# Patient Record
Sex: FEMALE | Race: WHITE | NOT HISPANIC OR LATINO | Employment: STUDENT | URBAN - METROPOLITAN AREA
[De-identification: names, ages, dates, MRNs, and addresses within clinical notes are randomized per-mention and may not be internally consistent; named-entity substitution may affect disease eponyms.]

---
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
## 2023-12-08 ENCOUNTER — OFFICE VISIT (OUTPATIENT)
Dept: PODIATRY | Facility: CLINIC | Age: 18
End: 2023-12-08
Payer: COMMERCIAL

## 2023-12-08 VITALS
WEIGHT: 142.2 LBS | DIASTOLIC BLOOD PRESSURE: 68 MMHG | HEIGHT: 66 IN | BODY MASS INDEX: 22.85 KG/M2 | SYSTOLIC BLOOD PRESSURE: 102 MMHG

## 2023-12-08 DIAGNOSIS — L60.0 INGROWING LEFT GREAT TOENAIL: Primary | ICD-10-CM

## 2023-12-08 DIAGNOSIS — M79.675 GREAT TOE PAIN, LEFT: ICD-10-CM

## 2023-12-08 PROCEDURE — 99203 OFFICE O/P NEW LOW 30 MIN: CPT | Mod: 25 | Performed by: PODIATRIST

## 2023-12-08 PROCEDURE — 11730 AVULSION NAIL PLATE SIMPLE 1: CPT | Mod: TA | Performed by: PODIATRIST

## 2023-12-08 RX ORDER — DESOG-E.ESTRADIOL/E.ESTRADIOL 21-5 (28)
1 TABLET ORAL
COMMUNITY
Start: 2023-08-31

## 2023-12-08 RX ORDER — ESCITALOPRAM OXALATE 20 MG/1
1 TABLET ORAL
COMMUNITY
Start: 2023-10-19

## 2023-12-08 RX ORDER — BUSPIRONE HYDROCHLORIDE 10 MG/1
1 TABLET ORAL
COMMUNITY
Start: 2023-09-24

## 2023-12-08 NOTE — LETTER
"    12/8/2023         RE: Ana María Craft  [address] [facility] [address]        Dear Colleague,    Thank you for referring your patient, Ana María Craft, to the [facility] PODI[facility]RY. Please see a copy of my visit note below.    ASSESSMENT:  Encounter Diagnoses   Name Primary?     Ingrowing left great toenail, medial and lateral edge Yes     Great toe pain, left      MEDICAL DECISION MAKING:  The potential causes and nature of an ingrown toenail were discussed with the patient.  We reviewed the natural history/prognosis of the condition and potential risks if no treatment is provided.      Treatment options discussed included conservative management (oral antibiotics when coexisting infection, soaking of the foot, the use of a toe spacer, adequate width shoes) as well as surgical management (partial or total nail removal).  The pros and cons of both forms of treatment were reviewed.      I discussed the option of permanent removal via chemical matrixectomy. This is a reasonable option when there is no co-existing infection.     Ana María Craft elected to a partial avulsion, medial and lateral edge, left great toe.    Nail Avulsion Procedure, Bilateral edges  (non permanent removal)    The procedure was discussed with Ana María Craft, including risk of infection, abnormal nail regrowth, and possible need for a future, repeat nail procedure.  Post-procedure home cares were reviewed.  These cares are important for preventing infection and aiding in timely healing.   Verbal and written consent was obtained.   The site was marked and the \"Time Out\" called.     The base of the left great toe was injected with 2 cc of  2% Lidocaine plain.  The toe was then prepped with betadine solution. A tourniquet was placed around the base of the toe for hemostasis.   Next the toe was checked for adequate anesthesia.     The medial and lateral aspect of the nail was loosened from the nail bed and " marginal soft tissue attachments with a blunt instrument. A nail splitter was then used to make a longitudinal cut approximately 2 mm from each nail fold.  It was completed on both sides, atraumatically, under the eponychium with a [place] blade. Next, the medial and lateral nail edges were grasped with a hemostat and removed in total.      The tourniquet was removed.  Bacitracin ointment was applied to the nail bed edges, followed by a compressive dressing. TThe procedure was tolerated well without complication.     Ana María Craftis instructed to watch for, and call if,  increasing redness, drainage, and pain after 2-3 days.  Post procedure instructions provided - handout given.    Ryan Garcia DPM         Disclaimer: This note consists of symbols derived from keyboarding, dictation and/or voice recognition software. As a result, there may be errors in the script that have gone undetected. Please consider this when interpreting information found in this chart.    Ryan Garcia DPM, FACFAS, MS    [facility] of Podiatry/Foot & Ankle Surgery      ____________________________________________________________________    HPI:       Ana María presents today reporting an ingrown toenail involving her left great toe.  The problem started 2 to 3 weeks ago.  Throbbing and shooting  Pain can be rated as high as a 9 out of 10  Comes and goes  She works as a     *No past medical history on file.*  *No past surgical history on file.*  *  Current Outpatient Medications   Medication Sig Dispense Refill     busPIRone (BUSPAR) 10 MG tablet Take 1 tablet by mouth 2 times daily       escitalopram (LEXAPRO) 20 MG tablet Take 1 tablet by mouth daily at 2 pm       KARIVA 0.15-0.02/0.01 MG (21/5) tablet Take 1 tablet by mouth daily at 2 pm       oxyCODONE (ROXICODONE) 5 MG tablet Take 1 tablet (5 mg) by mouth every 6 hours as needed for pain (Patient not taking: Reported on 12/8/2023) 12 tablet 0         EXAM:    Vitals: BP  "102/68   Ht 1.676 m (5' 6\")   Wt 64.5 kg (142 lb 3.2 oz)   BMI 22.95 kg/m    BMI: Body mass index is 22.95 kg/m .    Constitutional:  Ana María Craft is in no apparent distress, appears well-nourished.  Cooperative with history and physical exam.    Vascular:  Pedal pulses are palpable for both the DP and PT arteries.  CFT < 3 sec.  No edema.      Neuro: Light touch sensation is intact to the L4, L5, S1 distributions  No evidence of weakness, spasticity, or contracture in the lower extremities.     Derm: Localized tenderness, erythema and edema involving the medial and lateral skin folds of the left hallux nail unit.    Musculoskeletal:    Lower extremity muscle strength is normal. No gross deformities.          Again, thank you for allowing me to participate in the care of your patient.        Sincerely,        Ryan Garcia, MADAN  "

## 2023-12-08 NOTE — PATIENT INSTRUCTIONS
[facility] you for choosing [facility]!    DR. CAMACHO'S CLINIC LOCATIONS:     [facility] TRIAGE LINE: [phone]   [address] APPOINTMENTS: [phone]   [place] [address] RADIOLOGY: [phone]   (Every other Tues - Wed - Fri PM) SET UP SURGERY: [phone]    PHYSICAL THERAPY: [phone]   [facility] BILLING QUESTIONS: [phone] [address] FAX: [phone]   [place] [address]    (Thurs & Fri AM)      INGROWN TOENAIL REMOVAL HOME CARE  1. Keep bandage on until that evening or the day after your procedure. If the bandage falls off, start the soaking process.    2. Some bleeding is normal. If bleeding seems excessive to you, place ice on top of your foot for 15-20 minutes and elevate your foot above heart level.    3. Over the counter pain medication (tylenol / ibuprofen), elevating your foot and ice application is all you will need for pain control.    4. If the bandage feels too tight and your toe is throbbing it is ok to remove the bandage and start soaking.     5. For one to two weeks, soak your foot twice a day in mild skin friendly soap (dish or hand soap) and warm water for 15 minutes. It is ok to soak your foot for a few minutes to loosen the dressing applied in the clinic. After soaking, blot dry and apply a regular band aid.    6. It is normal to experience some discomfort and redness around the nail for several days following the procedure. Drainage will likely appear a red - yellow. This is normal. If your toe is still draining a red - yellow fluid after 2 weeks keep continuing to soak foot another few days.    7. Initial discomfort might last for 2-3 days. You may resume with regular activity as soon as you are comfortable, as long as you keep the wound clean and dry and follow the soaking instruction. It is recommended that you do not enter public swimming pools/hot tubs while your toe is draining.    8. If you are  experiencing worsening pain and redness or notice pus after 2-3 days please contact the clinic. Ask to speak with a triage nurse and they will inform our team of your symptoms and we can advise if a follow up is needed.

## 2023-12-08 NOTE — PROGRESS NOTES
"ASSESSMENT:  Encounter Diagnoses   Name Primary?    Ingrowing left great toenail, medial and lateral edge Yes    Great toe pain, left      MEDICAL DECISION MAKING:  The potential causes and nature of an ingrown toenail were discussed with the patient.  We reviewed the natural history/prognosis of the condition and potential risks if no treatment is provided.      Treatment options discussed included conservative management (oral antibiotics when coexisting infection, soaking of the foot, the use of a toe spacer, adequate width shoes) as well as surgical management (partial or total nail removal).  The pros and cons of both forms of treatment were reviewed.      I discussed the option of permanent removal via chemical matrixectomy. This is a reasonable option when there is no co-existing infection.     Ana María Craft elected to a partial avulsion, medial and lateral edge, left great toe.    Nail Avulsion Procedure, Bilateral edges  (non permanent removal)    The procedure was discussed with Ana María Craft, including risk of infection, abnormal nail regrowth, and possible need for a future, repeat nail procedure.  Post-procedure home cares were reviewed.  These cares are important for preventing infection and aiding in timely healing.   Verbal and written consent was obtained.   The site was marked and the \"Time Out\" called.     The base of the left great toe was injected with 2 cc of  2% Lidocaine plain.  The toe was then prepped with betadine solution. A tourniquet was placed around the base of the toe for hemostasis.   Next the toe was checked for adequate anesthesia.     The medial and lateral aspect of the nail was loosened from the nail bed and marginal soft tissue attachments with a blunt instrument. A nail splitter was then used to make a longitudinal cut approximately 2 mm from each nail fold.  It was completed on both sides, atraumatically, under the eponychium with a [place] blade. Next, the medial and " "lateral nail edges were grasped with a hemostat and removed in total.      The tourniquet was removed.  Bacitracin ointment was applied to the nail bed edges, followed by a compressive dressing. TThe procedure was tolerated well without complication.     Ana María Craftis instructed to watch for, and call if,  increasing redness, drainage, and pain after 2-3 days.  Post procedure instructions provided - handout given.    Ryan Garcia DPM         Disclaimer: This note consists of symbols derived from keyboarding, dictation and/or voice recognition software. As a result, there may be errors in the script that have gone undetected. Please consider this when interpreting information found in this chart.    Ryan Garcia DPM, FACFAS, MS    [facility] of Podiatry/Foot & Ankle Surgery      ____________________________________________________________________    HPI:       Ana María presents today reporting an ingrown toenail involving her left great toe.  The problem started 2 to 3 weeks ago.  Throbbing and shooting  Pain can be rated as high as a 9 out of 10  Comes and goes  She works as a     *No past medical history on file.*  *No past surgical history on file.*  *  Current Outpatient Medications   Medication Sig Dispense Refill    busPIRone (BUSPAR) 10 MG tablet Take 1 tablet by mouth 2 times daily      escitalopram (LEXAPRO) 20 MG tablet Take 1 tablet by mouth daily at 2 pm      KARIVA 0.15-0.02/0.01 MG (21/5) tablet Take 1 tablet by mouth daily at 2 pm      oxyCODONE (ROXICODONE) 5 MG tablet Take 1 tablet (5 mg) by mouth every 6 hours as needed for pain (Patient not taking: Reported on 12/8/2023) 12 tablet 0         EXAM:    Vitals: /68   Ht 1.676 m (5' 6\")   Wt 64.5 kg (142 lb 3.2 oz)   BMI 22.95 kg/m    BMI: Body mass index is 22.95 kg/m .    Constitutional:  Ana María Craft is in no apparent distress, appears well-nourished.  Cooperative with history and physical exam.    Vascular:  Pedal " pulses are palpable for both the DP and PT arteries.  CFT < 3 sec.  No edema.      Neuro: Light touch sensation is intact to the L4, L5, S1 distributions  No evidence of weakness, spasticity, or contracture in the lower extremities.     Derm: Localized tenderness, erythema and edema involving the medial and lateral skin folds of the left hallux nail unit.    Musculoskeletal:    Lower extremity muscle strength is normal. No gross deformities.

## 2024-08-02 ENCOUNTER — OFFICE VISIT (OUTPATIENT)
Dept: PODIATRY | Facility: CLINIC | Age: 19
End: 2024-08-02
Payer: COMMERCIAL

## 2024-08-02 VITALS
HEIGHT: 66 IN | SYSTOLIC BLOOD PRESSURE: 118 MMHG | WEIGHT: 142 LBS | DIASTOLIC BLOOD PRESSURE: 80 MMHG | BODY MASS INDEX: 22.82 KG/M2

## 2024-08-02 DIAGNOSIS — M79.671 FOOT PAIN, BILATERAL: Primary | ICD-10-CM

## 2024-08-02 DIAGNOSIS — L60.0 INGROWN NAIL OF GREAT TOE OF RIGHT FOOT: ICD-10-CM

## 2024-08-02 DIAGNOSIS — L60.0 INGROWN NAIL OF GREAT TOE OF LEFT FOOT: ICD-10-CM

## 2024-08-02 DIAGNOSIS — M79.672 FOOT PAIN, BILATERAL: Primary | ICD-10-CM

## 2024-08-02 PROCEDURE — 99213 OFFICE O/P EST LOW 20 MIN: CPT | Mod: 25 | Performed by: PODIATRIST

## 2024-08-02 PROCEDURE — 11750 EXCISION NAIL&NAIL MATRIX: CPT | Mod: TA | Performed by: PODIATRIST

## 2024-08-02 RX ORDER — SILVER SULFADIAZINE 10 MG/G
CREAM TOPICAL 2 TIMES DAILY
Qty: 25 G | Refills: 1 | Status: SHIPPED | OUTPATIENT
Start: 2024-08-02

## 2024-08-02 NOTE — PATIENT INSTRUCTIONS
Thank you for choosing [facility] Podiatry / Foot & Ankle Surgery!    DR HULL'S CLINIC:  [facility]   [address]   [place] [address]      TRIAGE LINE: [phone]  APPOINTMENTS: [phone]  RADIOLOGY: [phone]  SET UP SURGERY: [phone]  PHYSICAL THERAPY: [phone]   FAX NUMBER: [phone]  BILLING QUESTIONS: [phone]       Follow up: As needed      INGROWN TOENAILS  When a toenail is ingrown, it is curved and grows into the skin, usually at the nail borders (the sides of the nail). This  digging in  of the nail irritates the skin, often creating pain, redness, swelling, and warmth in the toe.  If an ingrown nail causes a break in the skin, bacteria may enter and cause an infection in the area, which is often marked by drainage and a foul odor. However, even if the toe isn t painful, red, swollen, or warm, a nail that curves downward into the skin can progress to an infection.  CAUSES:  Heredity: In many people, the tendency for ingrown toenails is inherited.   Trauma: Sometimes an ingrown toenail is the result of trauma, such as stubbing your toe, having an object fall on your toe, or engaging in activities that involve repeated pressure on the toes, such as kicking or running.   Improper Trimming:  The most common cause of ingrown toenails is cutting your nails too short. This encourages the skin next to the nail to fold over the nail.   Improperly Sized Footwear: Ingrown toenails can result from wearing socks and shoes that are tight or short.   Nail Conditions: Ingrown toenails can be caused by nail problems, such as fungal infections or losing a nail due to trauma.   TREATMENT: Sometimes initial treatment for ingrown toenails can be safely performed at home. However, home treatment is strongly discouraged if an infection is suspected, or for those who have medical conditions that put feet at high risk, such as diabetes, nerve damage in the foot, or poor  circulation.  Home care: If you don t have an infection or any of the above medical conditions, you can soak your foot in room-temperature water (adding Epsom s salt may be recommended by your doctor), and gently massage the side of the nail fold to help reduce the inflammation.  Avoid attempting  bathroom surgery.  Repeated cutting of the nail can cause the condition to worsen over time. If your symptoms fail to improve, it s time to see a foot and ankle surgeon.  Physician care: After examining the toe, the foot and ankle surgeon will select the treatment best suited for you. If an infection is present, an oral antibiotic may be prescribed.  Sometimes a minor surgical procedure, often performed in the office, will ease the pain and remove the offending nail. After applying a local anesthetic, the doctor removes part of the nail s side border. Some nails may become ingrown again, requiring removal of the nail root.  Following the nail procedure, a light bandage will be applied. Most people experience very little pain after surgery and may resume normal activity the next day. If your surgeon has prescribed an oral antibiotic, be sure to take all the medication, even if your symptoms have improved.  PREVENTION:  Proper Trimming: Cut toenails in a fairly straight line, and don t cut them too short. You should be able to get your fingernail under the sides and end of the nail.   Well-fitting Footwear: Don t wear shoes that are short or tight in the toe area. Avoid shoes that are loose, because they too cause pressure on the toes, especially when running or walking briskly.     INGROWN TOENAIL REMOVAL AFTERCARE   Go directly home and elevate the affected foot on one or two pillows for the remainder of the day/evening if possible. Your toe may stay numb anywhere from 2-8 hours.   Take Tylenol, ibuprofen or another anti-inflammatory as needed for pain.   Take antibiotic if that has been prescribed. Finish the entire  prescribed antibiotic even if your symptoms have improved.   The evening of the procedure, soak/wash the affected area in warm water (you may add Epsom salt) for 5 to 10 minutes. Do this twice a day for 2-4 weeks (6-8 weeks if you had phenol) (you may count showering/bathing as one soak).  After soaks, pat the area dry and then allow to airdry for a few minutes. Apply antibiotic ointment to the area and cover with 2 X 2 gauze and paper tape or band-aid.  You may pursue everyday activities as tolerated with either an open toe shoe or cut-out shoe as needed or you may wear regular shoes if no pain is noted.  Watch for any signs and symptoms of infection such as: redness, red streaks going up the foot/leg, swelling, pus or foul odor. Those that have had the phenol procedure, the toe will drain longer and will look like it is infected because it is a chemical burn.   Please call with questions.

## 2024-08-02 NOTE — LETTER
"8/2/2024      Ana María Craft  [address] [facility] [address]      Dear Colleague,    Thank you for referring your patient, Ana María Craft, to the [facility] POD[facility]. Please see a copy of my visit note below.    Podiatry / Foot and Ankle Surgery Progress Note    August 2, 2024    Subject: Patient was seen for painful recurrent ingrown's both great toes.  Notes that they have been sore for about the last month or so.  Very sore in shoes.  She has had them removed previously and she is wondering about permanent removal.  Pain is 6 out of 10.  Denies specific injury.    Objective:  Vitals: /80   Ht 1.676 m (5' 6\")   Wt 64.4 kg (142 lb)   BMI 22.92 kg/m    BMI= Body mass index is 22.92 kg/m .    General:  Patient is alert and orientated.  NAD.    Vascular:  DP and PT pulses are palpable.  No edema or varicosities noted.  CFT's < 3secs.  Skin temp is normal.    Neuro:  Light and gross touch sensation intact to digits, dorsum, and plantar aspects of the feet.    Derm: Both borders of both great toenails are incurvated.  Localized redness and pain on palpation.    Musculoskeletal:  No foot deformity noted.      Assessment:    Foot pain, bilateral  Ingrown nail of great toe of right foot  Ingrown nail of great toe of left foot      Medical Decision Making/Plan:  The potential causes and nature of an ingrown toenail were discussed with the patient.  We reviewed the natural history/prognosis of the condition and potential risks if no treatment is provided.      Treatment options discussed included conservative management (oral antibiotics, soaking of foot, adequate width shoes)  as well as surgical management (partial or total nail removal).  The pros and cons of both forms of treatment were reviewed.      After thorough discussion and answering all questions, the patient elected to have the borders removed permanently.  Please see procedure note below.  We will have her soak the " feet twice a day for 6 weeks and apply Silvadene cream and a bandage.    All questions were answered to patient satisfaction and she will call further questions or concerns.    Procedure:After verbal consent, the right big toe was anesthetized with 5cc's of 1% lidocaine plain. A tourniquet was applied to the toe. The medial and lateral borders were then raised from the nail bed and then cut the length of the nail.  The offending nail borders were then removed.  Three 30 second applications of phenol were applied to the nail bed and nail matrix.  The area was then flushed with copious amounts of alcohol.  Bacitracin was applied to the nail bed.  The tourniquet was removed.  Bandage was applied to the toe.  The patient tolerated the procedure and anesthesia well.     Procedure #2: Exact same procedure done to the left big toe.      Patient Risk Factor:  Patient is a low risk factor for infection.     Анна Valdez DPM, Podiatry/Foot and Ankle Surgery      Again, thank you for allowing me to participate in the care of your patient.        Sincerely,        Анна Valdez DPM, Podiatry/Foot and Ankle Surgery

## 2024-08-02 NOTE — PROGRESS NOTES
"Podiatry / Foot and Ankle Surgery Progress Note    August 2, 2024    Subject: Patient was seen for painful recurrent ingrown's both great toes.  Notes that they have been sore for about the last month or so.  Very sore in shoes.  She has had them removed previously and she is wondering about permanent removal.  Pain is 6 out of 10.  Denies specific injury.    Objective:  Vitals: /80   Ht 1.676 m (5' 6\")   Wt 64.4 kg (142 lb)   BMI 22.92 kg/m    BMI= Body mass index is 22.92 kg/m .    General:  Patient is alert and orientated.  NAD.    Vascular:  DP and PT pulses are palpable.  No edema or varicosities noted.  CFT's < 3secs.  Skin temp is normal.    Neuro:  Light and gross touch sensation intact to digits, dorsum, and plantar aspects of the feet.    Derm: Both borders of both great toenails are incurvated.  Localized redness and pain on palpation.    Musculoskeletal:  No foot deformity noted.      Assessment:    Foot pain, bilateral  Ingrown nail of great toe of right foot  Ingrown nail of great toe of left foot      Medical Decision Making/Plan:  The potential causes and nature of an ingrown toenail were discussed with the patient.  We reviewed the natural history/prognosis of the condition and potential risks if no treatment is provided.      Treatment options discussed included conservative management (oral antibiotics, soaking of foot, adequate width shoes)  as well as surgical management (partial or total nail removal).  The pros and cons of both forms of treatment were reviewed.      After thorough discussion and answering all questions, the patient elected to have the borders removed permanently.  Please see procedure note below.  We will have her soak the feet twice a day for 6 weeks and apply Silvadene cream and a bandage.    All questions were answered to patient satisfaction and she will call further questions or concerns.    Procedure:After verbal consent, the right big toe was anesthetized with " 5cc's of 1% lidocaine plain. A tourniquet was applied to the toe. The medial and lateral borders were then raised from the nail bed and then cut the length of the nail.  The offending nail borders were then removed.  Three 30 second applications of phenol were applied to the nail bed and nail matrix.  The area was then flushed with copious amounts of alcohol.  Bacitracin was applied to the nail bed.  The tourniquet was removed.  Bandage was applied to the toe.  The patient tolerated the procedure and anesthesia well.     Procedure #2: Exact same procedure done to the left big toe.      Patient Risk Factor:  Patient is a low risk factor for infection.     Анна Valdez DPM, Podiatry/Foot and Ankle Surgery

## 2025-04-09 ENCOUNTER — OFFICE VISIT (OUTPATIENT)
Dept: PODIATRY | Facility: CLINIC | Age: 20
End: 2025-04-09
Payer: COMMERCIAL

## 2025-04-09 VITALS — BODY MASS INDEX: 22.92 KG/M2 | WEIGHT: 142 LBS

## 2025-04-09 DIAGNOSIS — L60.0 INGROWN NAIL OF GREAT TOE OF LEFT FOOT: ICD-10-CM

## 2025-04-09 DIAGNOSIS — M79.672 LEFT FOOT PAIN: Primary | ICD-10-CM

## 2025-04-09 PROCEDURE — 11750 EXCISION NAIL&NAIL MATRIX: CPT | Mod: TA | Performed by: PODIATRIST

## 2025-04-09 PROCEDURE — 99213 OFFICE O/P EST LOW 20 MIN: CPT | Mod: 25 | Performed by: PODIATRIST

## 2025-04-09 RX ORDER — SILVER SULFADIAZINE 10 MG/G
CREAM TOPICAL 2 TIMES DAILY
Qty: 25 G | Refills: 1 | Status: SHIPPED | OUTPATIENT
Start: 2025-04-09

## 2025-04-09 NOTE — PATIENT INSTRUCTIONS
Thank you for choosing [facility] Podiatry / Foot & Ankle Surgery!    [facility] CLINIC:  [facility]   [address]   [place] [address]  [phone]    (Tues, Wed, Thur am, Fri pm)     [facility]  [address], [place] [address]  [phone]  (Every other Friday morning)    [facility]  [address] [place] [id number] [phone]  (Every other Friday)     TRIAGE LINE: [phone]  APPOINTMENTS: [phone]  RADIOLOGY: [phone]  SET UP SURGERY: [phone]  PHYSICAL THERAPY: [phone]   FAX NUMBER: [phone]  BILLING QUESTIONS: [phone]       Follow up: As needed      INGROWN TOENAILS  When a toenail is ingrown, it is curved and grows into the skin, usually at the nail borders (the sides of the nail). This  digging in  of the nail irritates the skin, often creating pain, redness, swelling, and warmth in the toe.  If an ingrown nail causes a break in the skin, bacteria may enter and cause an infection in the area, which is often marked by drainage and a foul odor. However, even if the toe isn t painful, red, swollen, or warm, a nail that curves downward into the skin can progress to an infection.  CAUSES:  Heredity: In many people, the tendency for ingrown toenails is inherited.   Trauma: Sometimes an ingrown toenail is the result of trauma, such as stubbing your toe, having an object fall on your toe, or engaging in activities that involve repeated pressure on the toes, such as kicking or running.   Improper Trimming:  The most common cause of ingrown toenails is cutting your nails too short. This encourages the skin next to the nail to fold over the nail.   Improperly Sized Footwear: Ingrown toenails can result from wearing socks and shoes that are tight or short.   Nail Conditions: Ingrown toenails can be caused by nail problems, such as fungal infections or losing a nail due to trauma.   TREATMENT:  Sometimes initial treatment for ingrown toenails can be safely performed at home. However, home treatment is strongly discouraged if an infection is suspected, or for those who have medical conditions that put feet at high risk, such as diabetes, nerve damage in the foot, or poor circulation.  Home care: If you don t have an infection or any of the above medical conditions, you can soak your foot in room-temperature water (adding Epsom s salt may be recommended by your doctor), and gently massage the side of the nail fold to help reduce the inflammation.  Avoid attempting  bathroom surgery.  Repeated cutting of the nail can cause the condition to worsen over time. If your symptoms fail to improve, it s time to see a foot and ankle surgeon.  Physician care: After examining the toe, the foot and ankle surgeon will select the treatment best suited for you. If an infection is present, an oral antibiotic may be prescribed.  Sometimes a minor surgical procedure, often performed in the office, will ease the pain and remove the offending nail. After applying a local anesthetic, the doctor removes part of the nail s side border. Some nails may become ingrown again, requiring removal of the nail root.  Following the nail procedure, a light bandage will be applied. Most people experience very little pain after surgery and may resume normal activity the next day. If your surgeon has prescribed an oral antibiotic, be sure to take all the medication, even if your symptoms have improved.  PREVENTION:  Proper Trimming: Cut toenails in a fairly straight line, and don t cut them too short. You should be able to get your fingernail under the sides and end of the nail.   Well-fitting Footwear: Don t wear shoes that are short or tight in the toe area. Avoid shoes that are loose, because they too cause pressure on the toes, especially when running or walking briskly.     INGROWN TOENAIL REMOVAL AFTERCARE   Go directly home and elevate  the affected foot on one or two pillows for the remainder of the day/evening if possible. Your toe may stay numb anywhere from 2-8 hours.   Take Tylenol, ibuprofen or another anti-inflammatory as needed for pain.   Take antibiotic if that has been prescribed. Finish the entire prescribed antibiotic even if your symptoms have improved.   The evening of the procedure, soak/wash the affected area in warm water (you may add Epsom salt) for 5 to 10 minutes. Do this twice a day for 2-4 weeks (6-8 weeks if you had phenol) (you may count showering/bathing as one soak).  After soaks, pat the area dry and then allow to airdry for a few minutes. Apply antibiotic ointment to the area and cover with 2 X 2 gauze and paper tape or band-aid.  You may pursue everyday activities as tolerated with either an open toe shoe or cut-out shoe as needed or you may wear regular shoes if no pain is noted.  Watch for any signs and symptoms of infection such as: redness, red streaks going up the foot/leg, swelling, pus or foul odor. Those that have had the phenol procedure, the toe will drain longer and will look like it is infected because it is a chemical burn.   Please call with questions.

## 2025-04-09 NOTE — LETTER
4/9/2025      Ana María Craft  [address] [address] [facility] [address]      Dear Colleague,    Thank you for referring your patient, Ana María Craft, to the [facility] PODIATRY. Please see a copy of my visit note below.    Podiatry / Foot and Ankle Surgery Progress Note    April 9, 2025    Subject: Patient was seen for recurrent pain to the left great toe.  Notes it started about 2 weeks ago.  Thinks that the nails have grown back in.  Pain can be 5 out of 10 at its worst.  Worse with pressure.  Denies specific injury.    Objective:  Vitals: Wt 64.4 kg (142 lb)   BMI 22.92 kg/m    BMI= Body mass index is 22.92 kg/m .    General:  Patient is alert and orientated.  NAD.    Vascular:  DP and PT pulses are palpable.  No edema or varicosities noted.  CFT's < 3secs.  Skin temp is normal.     Neuro:  Light and gross touch sensation intact to digits, dorsum, and plantar aspects of the feet.     Derm: Both borders of left great toenail are incurvated.  Localized redness and pain on palpation.     Musculoskeletal:  No foot deformity noted.       Assessment:     Left foot pain  Ingrown nail of great toe of left foot      Medical Decision Making/Plan:  The potential causes and nature of an ingrown toenail were discussed with the patient.  We reviewed the natural history/prognosis of the condition and potential risks if no treatment is provided.      Treatment options discussed included conservative management (oral antibiotics, soaking of foot, adequate width shoes)  as well as surgical management (partial or total nail removal).  The pros and cons of both forms of treatment were reviewed.      After thorough discussion and answering all questions, the patient elected to have the borders removed permanently.  Please see procedure note below.  We will have her soak the feet twice a day for 6 weeks and apply Silvadene cream and a bandage.     All questions were answered to patient satisfaction and she  will call further questions or concerns.     Procedure:After verbal consent, the left big toe was anesthetized with 5cc's of 1% lidocaine plain. A tourniquet was applied to the toe. The medial and lateral borders were then raised from the nail bed and then cut the length of the nail.  The offending nail borders were then removed.  Three 30 second applications of phenol were applied to the nail bed and nail matrix.  The area was then flushed with copious amounts of alcohol.  Bacitracin was applied to the nail bed.  The tourniquet was removed.  Bandage was applied to the toe.  The patient tolerated the procedure and anesthesia well.      Patient Risk Factor:  Patient is a low risk factor for infection.     Анна Valdez DPM, Podiatry/Foot and Ankle Surgery      Again, thank you for allowing me to participate in the care of your patient.        Sincerely,        Анна Valdez DPM, Podiatry/Foot and Ankle Surgery    Electronically signed

## 2025-04-09 NOTE — PROGRESS NOTES
Podiatry / Foot and Ankle Surgery Progress Note    April 9, 2025    Subject: Patient was seen for recurrent pain to the left great toe.  Notes it started about 2 weeks ago.  Thinks that the nails have grown back in.  Pain can be 5 out of 10 at its worst.  Worse with pressure.  Denies specific injury.    Objective:  Vitals: Wt 64.4 kg (142 lb)   BMI 22.92 kg/m    BMI= Body mass index is 22.92 kg/m .    General:  Patient is alert and orientated.  NAD.    Vascular:  DP and PT pulses are palpable.  No edema or varicosities noted.  CFT's < 3secs.  Skin temp is normal.     Neuro:  Light and gross touch sensation intact to digits, dorsum, and plantar aspects of the feet.     Derm: Both borders of left great toenail are incurvated.  Localized redness and pain on palpation.     Musculoskeletal:  No foot deformity noted.       Assessment:     Left foot pain  Ingrown nail of great toe of left foot      Medical Decision Making/Plan:  The potential causes and nature of an ingrown toenail were discussed with the patient.  We reviewed the natural history/prognosis of the condition and potential risks if no treatment is provided.      Treatment options discussed included conservative management (oral antibiotics, soaking of foot, adequate width shoes)  as well as surgical management (partial or total nail removal).  The pros and cons of both forms of treatment were reviewed.      After thorough discussion and answering all questions, the patient elected to have the borders removed permanently.  Please see procedure note below.  We will have her soak the feet twice a day for 6 weeks and apply Silvadene cream and a bandage.     All questions were answered to patient satisfaction and she will call further questions or concerns.     Procedure:After verbal consent, the left big toe was anesthetized with 5cc's of 1% lidocaine plain. A tourniquet was applied to the toe. The medial and lateral borders were then raised from the nail bed  and then cut the length of the nail.  The offending nail borders were then removed.  Three 30 second applications of phenol were applied to the nail bed and nail matrix.  The area was then flushed with copious amounts of alcohol.  Bacitracin was applied to the nail bed.  The tourniquet was removed.  Bandage was applied to the toe.  The patient tolerated the procedure and anesthesia well.      Patient Risk Factor:  Patient is a low risk factor for infection.     Анна Valdez DPM, Podiatry/Foot and Ankle Surgery